# Patient Record
Sex: MALE | Race: WHITE
[De-identification: names, ages, dates, MRNs, and addresses within clinical notes are randomized per-mention and may not be internally consistent; named-entity substitution may affect disease eponyms.]

---

## 2017-02-01 ENCOUNTER — HOSPITAL ENCOUNTER (OUTPATIENT)
Dept: HOSPITAL 39 - GMAM | Age: 77
End: 2017-02-01
Attending: FAMILY MEDICINE
Payer: MEDICARE

## 2017-02-01 DIAGNOSIS — E03.9: Primary | ICD-10-CM

## 2017-02-08 ENCOUNTER — HOSPITAL ENCOUNTER (OUTPATIENT)
Dept: HOSPITAL 39 - GMAM | Age: 77
Discharge: HOME | End: 2017-02-08
Attending: FAMILY MEDICINE
Payer: MEDICARE

## 2017-02-08 DIAGNOSIS — Z12.5: ICD-10-CM

## 2017-02-08 DIAGNOSIS — D64.9: Primary | ICD-10-CM

## 2017-02-08 PROCEDURE — 83540 ASSAY OF IRON: CPT

## 2017-02-08 PROCEDURE — 83550 IRON BINDING TEST: CPT

## 2017-02-08 PROCEDURE — 82728 ASSAY OF FERRITIN: CPT

## 2017-02-08 PROCEDURE — 85045 AUTOMATED RETICULOCYTE COUNT: CPT

## 2017-03-03 ENCOUNTER — HOSPITAL ENCOUNTER (OUTPATIENT)
Dept: HOSPITAL 39 - BFHH | Age: 77
Discharge: HOME | End: 2017-03-03
Attending: FAMILY MEDICINE
Payer: MEDICARE

## 2017-03-03 DIAGNOSIS — D64.9: Primary | ICD-10-CM

## 2017-05-03 ENCOUNTER — HOSPITAL ENCOUNTER (OUTPATIENT)
Dept: HOSPITAL 39 - GMAM | Age: 77
Discharge: HOME | End: 2017-05-03
Attending: FAMILY MEDICINE
Payer: MEDICARE

## 2017-05-03 DIAGNOSIS — D64.9: Primary | ICD-10-CM

## 2017-06-20 ENCOUNTER — HOSPITAL ENCOUNTER (OUTPATIENT)
Dept: HOSPITAL 39 - GMA | Age: 77
Discharge: HOME | End: 2017-06-20
Attending: NURSE PRACTITIONER
Payer: MEDICARE

## 2017-06-20 DIAGNOSIS — R31.9: Primary | ICD-10-CM

## 2017-07-05 ENCOUNTER — HOSPITAL ENCOUNTER (OUTPATIENT)
Dept: HOSPITAL 39 - BFHH | Age: 77
Discharge: HOME | End: 2017-07-05
Attending: FAMILY MEDICINE
Payer: MEDICARE

## 2017-07-05 DIAGNOSIS — R35.0: Primary | ICD-10-CM

## 2017-07-17 ENCOUNTER — HOSPITAL ENCOUNTER (OUTPATIENT)
Dept: HOSPITAL 39 - GMA | Age: 77
End: 2017-07-17
Attending: NURSE PRACTITIONER
Payer: MEDICARE

## 2017-07-17 DIAGNOSIS — R31.9: Primary | ICD-10-CM

## 2017-08-15 ENCOUNTER — HOSPITAL ENCOUNTER (OUTPATIENT)
Dept: HOSPITAL 39 - BFHH | Age: 77
Discharge: HOME | End: 2017-08-15
Attending: FAMILY MEDICINE
Payer: MEDICARE

## 2017-08-15 DIAGNOSIS — I42.9: ICD-10-CM

## 2017-08-15 DIAGNOSIS — F32.1: ICD-10-CM

## 2017-08-15 DIAGNOSIS — I73.9: Primary | ICD-10-CM

## 2017-08-15 DIAGNOSIS — I25.10: ICD-10-CM

## 2017-09-14 ENCOUNTER — HOSPITAL ENCOUNTER (OUTPATIENT)
Dept: HOSPITAL 39 - RESP | Age: 77
End: 2017-09-14
Attending: ORTHOPAEDIC SURGERY
Payer: MEDICARE

## 2017-09-14 DIAGNOSIS — Z01.818: Primary | ICD-10-CM

## 2017-10-18 ENCOUNTER — HOSPITAL ENCOUNTER (OUTPATIENT)
Dept: HOSPITAL 39 - BFHH | Age: 77
Discharge: HOME | End: 2017-10-18
Attending: FAMILY MEDICINE
Payer: MEDICARE

## 2017-10-18 DIAGNOSIS — I73.9: Primary | ICD-10-CM

## 2017-10-18 DIAGNOSIS — I42.9: ICD-10-CM

## 2018-02-12 ENCOUNTER — HOSPITAL ENCOUNTER (OUTPATIENT)
Dept: HOSPITAL 39 - BFHH | Age: 78
End: 2018-02-12
Attending: FAMILY MEDICINE
Payer: MEDICARE

## 2018-02-12 DIAGNOSIS — I73.89: Primary | ICD-10-CM

## 2018-02-12 DIAGNOSIS — Z12.5: ICD-10-CM

## 2018-02-12 DIAGNOSIS — E03.9: ICD-10-CM

## 2018-02-12 DIAGNOSIS — L97.512: ICD-10-CM

## 2018-02-12 DIAGNOSIS — D64.9: ICD-10-CM

## 2018-02-12 DIAGNOSIS — I25.10: ICD-10-CM

## 2018-02-12 DIAGNOSIS — R97.20: ICD-10-CM

## 2018-02-12 DIAGNOSIS — Z72.0: ICD-10-CM

## 2018-04-03 ENCOUNTER — HOSPITAL ENCOUNTER (OUTPATIENT)
Dept: HOSPITAL 39 - GMAM | Age: 78
Discharge: HOME | End: 2018-04-03
Attending: FAMILY MEDICINE
Payer: MEDICARE

## 2018-04-03 DIAGNOSIS — E03.9: Primary | ICD-10-CM

## 2018-05-13 ENCOUNTER — HOSPITAL ENCOUNTER (EMERGENCY)
Dept: HOSPITAL 39 - ER | Age: 78
Discharge: HOME | End: 2018-05-13
Payer: MEDICARE

## 2018-05-13 VITALS — DIASTOLIC BLOOD PRESSURE: 61 MMHG | OXYGEN SATURATION: 95 % | TEMPERATURE: 100.9 F | SYSTOLIC BLOOD PRESSURE: 107 MMHG

## 2018-05-13 DIAGNOSIS — Z79.82: ICD-10-CM

## 2018-05-13 DIAGNOSIS — Z95.0: ICD-10-CM

## 2018-05-13 DIAGNOSIS — R50.9: ICD-10-CM

## 2018-05-13 DIAGNOSIS — R53.81: Primary | ICD-10-CM

## 2018-05-13 DIAGNOSIS — R05: ICD-10-CM

## 2018-05-13 DIAGNOSIS — E07.9: ICD-10-CM

## 2018-05-13 DIAGNOSIS — I10: ICD-10-CM

## 2018-05-13 DIAGNOSIS — I25.2: ICD-10-CM

## 2018-05-13 DIAGNOSIS — Z98.61: ICD-10-CM

## 2018-05-13 DIAGNOSIS — K21.9: ICD-10-CM

## 2018-05-13 DIAGNOSIS — Z79.02: ICD-10-CM

## 2018-05-13 PROCEDURE — 80076 HEPATIC FUNCTION PANEL: CPT

## 2018-05-13 PROCEDURE — 83605 ASSAY OF LACTIC ACID: CPT

## 2018-05-13 PROCEDURE — 82553 CREATINE MB FRACTION: CPT

## 2018-05-13 PROCEDURE — 84484 ASSAY OF TROPONIN QUANT: CPT

## 2018-05-13 PROCEDURE — 85610 PROTHROMBIN TIME: CPT

## 2018-05-13 PROCEDURE — 71045 X-RAY EXAM CHEST 1 VIEW: CPT

## 2018-05-13 PROCEDURE — 36415 COLL VENOUS BLD VENIPUNCTURE: CPT

## 2018-05-13 PROCEDURE — 82550 ASSAY OF CK (CPK): CPT

## 2018-05-13 PROCEDURE — 86788 WEST NILE VIRUS AB IGM: CPT

## 2018-05-13 PROCEDURE — 87040 BLOOD CULTURE FOR BACTERIA: CPT

## 2018-05-13 PROCEDURE — 85025 COMPLETE CBC W/AUTO DIFF WBC: CPT

## 2018-05-13 PROCEDURE — 86789 WEST NILE VIRUS ANTIBODY: CPT

## 2018-05-13 PROCEDURE — 85730 THROMBOPLASTIN TIME PARTIAL: CPT

## 2018-05-13 PROCEDURE — 80048 BASIC METABOLIC PNL TOTAL CA: CPT

## 2018-05-13 PROCEDURE — 93005 ELECTROCARDIOGRAM TRACING: CPT

## 2018-05-13 NOTE — RAD
EXAM DESCRIPTION:

Chest,1 View



CLINICAL HISTORY:

77 years Male, fever



COMPARISON:

October 2, 2016.



FINDINGS:

Heart size appears borderline. There is atherosclerotic change in

the thoracic aorta. Poststernotomy changes are again noted, along

with a left subclavian transvenous pacemaker/AICD device. The

battery pack overlies the left upper lung field. The lungs appear

otherwise essentially clear except for slightly pronounced

markings and possible mild peribronchial thickening in the right

base. The latter is not much different than on the previous

study. No major consolidation is identified.



IMPRESSION:

Possible mild chronic bronchitic change in the right lung base.

Otherwise, no radiographic evidence of acute cardiopulmonary

disease.



Electronically signed by:  Shaheen Glover MD  5/13/2018 4:21

PM CDT Workstation: [id number]

## 2018-05-13 NOTE — ED.PDOC
History of Present Illness





- General


Chief Complaint: General


Stated Complaint: Weakness, fever


Time Seen by Provider: 05/13/18 15:59


Source: patient, EMS


Exam Limitations: no limitations





- History of Present Illness


Initial Comments: 





Yovani Russo 78 y/o male brought by EMS after familly called that he had been 

feeling weak ,febrile and felt some chills since this am.No diarrhea,no N/V,no 

dysuria.Stated with some dry cough no hemoptysis no sob.


Timing/Duration: 4-6 hours


Severity: moderate


Improving Factors: nothing


Worsening Factors: nothing


Associated Symptoms: other - see hpi


Allergies/Adverse Reactions: 


Allergies





NO KNOWN ALLERGY Allergy (Verified 07/20/16 02:04)


 








Home Medications: 


Ambulatory Orders





ALPRAZolam [Xanax] 0.5 mg PO PRN 07/20/16 


Aspirin [Aspirin Adult Low Dose] 2 tablet PO QAM 07/20/16 


Carvedilol 6.25 mg PO BID 07/20/16 


Fenofibrate [Tricor] 145 mg PO QAM 07/20/16 


Hydrocodone-Acetaminophen [Hydrocodone/Acetaminophen  mg] 1 - 2 each PO 

Q6HR PRN 07/20/16 


Levothyroxine Sodium 50 mcg PO QAM 07/20/16 


Metoclopramide HCl 10 mg PO BEDTIME 07/20/16 


Clopidogrel Bisulfate [Plavix] 75 mg PO QAM 10/02/16 


DULoxetine HCL [Cymbalta] 30 mg PO BID 10/02/16 


Losartan Potassium 25 mg PO QAM 10/02/16 


Esomeprazole Magnesium [Nexium] 40 mg PO QAM 09/18/17 


Ezetimibe-Simvastatin [Vytorin 10-10 mg] 1 tab PO BEDTIME 09/18/17 


Furosemide [Lasix] 20 mg PO PRN PRN MDD DIURESIS 09/18/17 


levoFLOXacin [Levaquin] 500 mg PO DAILY 6 Days #6 tab 05/13/18 











Review of Systems





- Review of Systems


Constitutional: States: see HPI, fever


EENTM: States: no symptoms reported


Respiratory: States: cough - dry


Cardiology: States: no symptoms reported


Gastrointestinal/Abdominal: States: no symptoms reported


Musculoskeletal: States: no symptoms reported


Skin: States: no symptoms reported


Neurological: States: no symptoms reported


All other Systems: Reviewed and Negative, No Change from Baseline





Past Medical History (General)





- Patient Medical History


Hx Seizures: No


Hx Stroke: No


Hx Dementia: No


Hx Asthma: No


Hx of COPD: No


Hx Cardiac Disorders: Yes - hx MI


Hx Congestive Heart Failure: No


Hx Pacemaker: No


Hx Hypertension: Yes


Hx Thyroid Disease: Yes


Hx Diabetes: No


Hx Gastroesophageal Reflux: Yes


Hx Renal Disease: No


Hx Cancer: No


Hx of HIV: No


Hx Hepatitis C: No


Hx MRSA: No


Hx Other PMH: Yes - degenerative joint knee;PAD


Surgical History: pacemaker, other - angioplasty





- Vaccination History


Hx Tetanus, Diphtheria Vaccination: No


Hx Influenza Vaccination: Yes


Hx Pneumococcal Vaccination: Yes





- Social History


Hx Tobacco Use: No


Hx Chewing Tobacco Use: No


Hx Alcohol Use: No


Hx Substance Use: No


Hx Substance Use Treatment: No


Hx Depression: No


Hx Physical Abuse: No


Hx Emotional Abuse: No


Hx Suspected Abuse: No





- Activities of Daily Living


Patient Lives Alone: No - wife





- Female History


Patient Pregnant: No





Family Medical History





- Family History


  ** Mother


Family History: Unknown


Hx Family Cancer: Yes - mom





Physical Exam





- Physical Exam


General Appearance: Alert, Comfortable, No apparent distress


Eye Exam: bilateral normal


Ears, Nose, Throat: hearing grossly normal, normal ENT inspection, normal 

pharynx


Neck: non-tender, full range of motion, supple, normal inspection


Respiratory: chest non-tender, lungs clear, no respiratory distress


Cardiovascular/Chest: normal peripheral pulses, regular rate, rhythm, no murmur


Peripheral Pulses: radial,right: 2+, radial,left: 2+


Gastrointestinal/Abdominal: non tender, soft, no organomegaly


Back Exam: no CVA tenderness, no vertebral tenderness


Extremity: no pedal edema, no calf tenderness


Neurologic: alert, oriented x 3


Skin Exam: normal color, warm/dry


Lymphatic: no adenopathy





Progress





- Progress


Progress: 





05/13/18 16:18


 Vital Signs - 8 hr











  05/13/18





  15:57


 


Temperature 100.4 F H


 


Pulse Rate [ 94 H





Apical] 


 


Respiratory 18





Rate 


 


Blood Pressure 110/65





[Right Arm] 


 


O2 Sat by Pulse 96





Oximetry 














- Results/Orders


Results/Orders: 





 





05/13/18 16:00


BLOOD CULTURE Stat 


WEST NILE VIRUS AB PANEL Routine 


URINALYSIS Stat  (Cancelled)





05/13/18 16:02


IV Care:Saline Lock per Protoc QSHIFT 


Sodium Chloride 0.9% 500Ml [NS 500ml] 500 ml IVS .QD 








 Laboratory Results - last 24 hr











  05/13/18 05/13/18





  16:07 16:07


 


WBC  7.6 


 


RBC  3.40 L 


 


Hgb  10.2 L 


 


Hct  30.2 L 


 


MCV  88.8 


 


MCH  30.0 


 


MCHC  33.8 


 


RDW  15.0 H 


 


Plt Count  177 


 


MPV  8.7 


 


Absolute Neuts (auto)  5.90 


 


Absolute Lymphs (auto)  0.80 L 


 


Absolute Monos (auto)  0.50 


 


Absolute Eos (auto)  0.30 


 


Absolute Basos (auto)  0.00 


 


Neutrophils %  78.0 


 


Lymphocytes %  10.8 L 


 


Monocytes %  6.9 


 


Eosinophils %  3.8 


 


Basophils %  0.5 


 


PT  13.5 H 


 


INR  1.170 


 


PTT (SP)  36.9 H 


 


Sodium  135 


 


Potassium  4.0 


 


Chloride  100 L 


 


Carbon Dioxide  25 


 


Anion Gap  14.0 


 


BUN  29 H 


 


Creatinine  1.34 H 


 


BUN/Creatinine Ratio  21.6 H 


 


Random Glucose  101 


 


Serum Osmolality  276.1 


 


Lactic Acid   1.2


 


Calcium  9.3 


 


Magnesium  1.7 L 


 


Total Bilirubin  0.6 


 


Direct Bilirubin  0.2 


 


Indirect Bilirubin  0.4 


 


AST  20 


 


ALT  11 


 


Alkaline Phosphatase  59 


 


Creatine Kinase  49 


 


CK-MB (CK-2)  1.0 


 


CK-MB (CK-2) %  Not Reportable 


 


Troponin I  0.03 


 


Serum Total Protein  7.3 


 


Albumin  3.4 














- EKG/XRAY/CT


Comments: HR-86 pacemaker ,rhythm


XRAY: chest - mild chronic bronchitic changes lung base /radiologist





Departure





- Departure


Clinical Impression: 


 Malaise and fatigue





Fever


Qualifiers:


 Fever type: unspecified Qualified Code(s): R50.9 - Fever, unspecified





Time of Disposition: 19:28


Disposition: Discharge to Home or Self Care


Condition: Fair


Departure Forms:  ED Discharge - Pt. Copy, Patient Portal Self Enrollment


Instructions:  DI for Fever (Symptom) -- Adult


Referrals: 


Vasquez Flores MD [Primary Care Provider] - 1-2 Weeks


Prescriptions: 


levoFLOXacin [Levaquin] 500 mg PO DAILY 6 Days #6 tab


Home Medications: 


Ambulatory Orders





ALPRAZolam [Xanax] 0.5 mg PO PRN 07/20/16 


Aspirin [Aspirin Adult Low Dose] 2 tablet PO QAM 07/20/16 


Carvedilol 6.25 mg PO BID 07/20/16 


Fenofibrate [Tricor] 145 mg PO QAM 07/20/16 


Hydrocodone-Acetaminophen [Hydrocodone/Acetaminophen  mg] 1 - 2 each PO 

Q6HR PRN 07/20/16 


Levothyroxine Sodium 50 mcg PO QAM 07/20/16 


Metoclopramide HCl 10 mg PO BEDTIME 07/20/16 


Clopidogrel Bisulfate [Plavix] 75 mg PO QAM 10/02/16 


DULoxetine HCL [Cymbalta] 30 mg PO BID 10/02/16 


Losartan Potassium 25 mg PO QAM 10/02/16 


Esomeprazole Magnesium [Nexium] 40 mg PO QAM 09/18/17 


Ezetimibe-Simvastatin [Vytorin 10-10 mg] 1 tab PO BEDTIME 09/18/17 


Furosemide [Lasix] 20 mg PO PRN PRN MDD DIURESIS 09/18/17 


levoFLOXacin [Levaquin] 500 mg PO DAILY 6 Days #6 tab 05/13/18 








Additional Instructions: 


Call up primary MD office in am 14/May 2018 for followup;Return to ER as needed;

Continue with all home medications;Tylenol 500mg one tablet every 6 hours as 

needed for fever

## 2018-10-25 ENCOUNTER — HOSPITAL ENCOUNTER (OUTPATIENT)
Dept: HOSPITAL 39 - RAD | Age: 78
End: 2018-10-25
Attending: ORTHOPAEDIC SURGERY
Payer: MEDICARE

## 2018-10-25 DIAGNOSIS — M25.561: ICD-10-CM

## 2018-10-25 DIAGNOSIS — M25.552: ICD-10-CM

## 2018-10-25 DIAGNOSIS — M25.562: ICD-10-CM

## 2018-10-25 DIAGNOSIS — M17.12: ICD-10-CM

## 2018-10-25 DIAGNOSIS — M17.11: Primary | ICD-10-CM

## 2018-10-25 DIAGNOSIS — M25.551: ICD-10-CM

## 2018-10-25 NOTE — RAD
EXAM DESCRIPTION: Pelvis



CLINICAL HISTORY: 78 years Male, HIP PN



COMPARISON: None.



FINDINGS: AP pelvis shows diffuse decreased bone density

consistent with osteopenia versus osteoporosis.

No fracture.

No bone lesion.

Severe diffuse calcific atherosclerosis.

Scoliosis and degenerative and postop changes of the lower lumbar

spine.



IMPRESSION:

Advanced chronic changes as noted-no acute finding



Electronically signed by:  Arnel Kaufman MD  10/25/2018 1:48 PM

CDT Workstation: [id number]

## 2018-10-25 NOTE — RAD
EXAM DESCRIPTION: Knee,Right Complete



CLINICAL HISTORY: 78 years, Male, KNEE PN



COMPARISON: None



TECHNIQUE: 4 views of the right knee



FINDINGS: Severe osteoarthritis of the right knee all 3

compartments but especially lateral compartment which is

completely collapsed. There is resulting valgus deformity.

Chondrocalcinosis medial meniscus. Moderate joint effusion.

No fracture or bone lesion.

Severe diffuse calcific atherosclerosis.



IMPRESSION: 

1.  Severe tricompartmental osteoarthritis with collapse of the

lateral compartment



Electronically signed by:  Arnel Kaufman MD  10/25/2018 1:47 PM

CDT Workstation: [id number]

## 2018-10-25 NOTE — RAD
EXAM DESCRIPTION: Knee,Left Complete



CLINICAL HISTORY: 78 years, Male, KNEE PN



COMPARISON: None



TECHNIQUE: 4 views of the left knee



FINDINGS: No acute fracture or dislocation or bone lesion. Minor

medial and patellofemoral joint space narrowing/osteoarthritis.

Tiny joint effusion.

Diffuse atrophy chronic vascular calcification of the SFA,

popliteal, and trifurcation vessels.

Slight deformity proximal fibula from remote prior fracture which

has healed.



IMPRESSION: 

1.  Osteoarthritis-mild

2.  Severe calcific atherosclerosis



Electronically signed by:  Arnel Kaufman MD  10/25/2018 1:34 PM

CDT Workstation: [id number]

## 2018-11-05 ENCOUNTER — HOSPITAL ENCOUNTER (OUTPATIENT)
Dept: HOSPITAL 39 - GMAM | Age: 78
End: 2018-11-05
Attending: FAMILY MEDICINE
Payer: MEDICARE

## 2018-11-05 DIAGNOSIS — E03.9: Primary | ICD-10-CM

## 2018-11-05 DIAGNOSIS — Z12.5: ICD-10-CM

## 2018-11-05 PROCEDURE — 84443 ASSAY THYROID STIM HORMONE: CPT

## 2018-11-05 PROCEDURE — 84439 ASSAY OF FREE THYROXINE: CPT

## 2018-12-04 ENCOUNTER — HOSPITAL ENCOUNTER (OUTPATIENT)
Dept: HOSPITAL 39 - RESP | Age: 78
End: 2018-12-04
Attending: FAMILY MEDICINE
Payer: MEDICARE

## 2018-12-04 DIAGNOSIS — Z01.818: Primary | ICD-10-CM

## 2019-01-24 ENCOUNTER — HOSPITAL ENCOUNTER (OUTPATIENT)
Dept: HOSPITAL 39 - US | Age: 79
End: 2019-01-24
Attending: PHYSICIAN ASSISTANT
Payer: MEDICARE

## 2019-01-24 DIAGNOSIS — M79.609: ICD-10-CM

## 2019-01-24 DIAGNOSIS — I70.8: ICD-10-CM

## 2019-01-24 DIAGNOSIS — I70.202: Primary | ICD-10-CM

## 2019-01-24 DIAGNOSIS — I73.9: ICD-10-CM

## 2019-01-24 NOTE — US
EXAM DESCRIPTION:  Extremity,Lower LT Arteries



CLINICAL HISTORY:  78 years  Male  M79.609



COMPARISON:  None.



TECHNIQUE:  Duplex imaging performed to evaluate the left lower

extremity venous structures. Compression imaging and augmentation

imaging performed. The common femoral, superficial femoral,

popliteal, greater saphenous and posterior tibial veins were

examined.



FINDINGS:



The velocity in the common femoral artery is 91 cm/s.

 There is a small amount of calcified plaquing. Triphasic

waveforms are noted in the common femoral.

There are triphasic waveforms in the proximal aspect of the

superficial femoral with a normal velocity of 67 cm/s.

Scattered areas of calcified and noncalcified plaque are present.

There is monophasic flow in the mid and distal superficial

femoral. Elevated velocity in the distal SFA consistent with a

focal area of stenosis.

Very minimal flow in the popliteal with a velocity 10 cm/s.

Monophasic tardus parvus flow suggesting severe stenosis.

Minimal monophasic flow in the region of the left peroneal. No

significant flow in the posterior tibial and dorsalis pedis.



IMPRESSION:



Diffuse atherosclerotic plaquing throughout the left lower

extremity



Findings suggest stenosis in the proximal to mid superficial

femoral artery with diffusely diseased superficial femoral and

popliteal



High-grade stenosis in the distal left SFA/proximal popliteal

resulting in diminished flow in the popliteal and calf



Monophasic flow with diminished velocities peroneal. No

significant flow is noted in the posterior tibial or dorsalis

pedis.



Electronically signed by:  Ingris Rubio MD  1/24/2019 5:26 PM

CST Workstation: [id number]

## 2019-01-24 NOTE — US
EXAM DESCRIPTION:  Venous,Lower Extremity LT



CLINICAL HISTORY:  78 years  Male  M79.609, left foot and toe

pain



COMPARISON:  None.



TECHNIQUE:  Duplex imaging performed to evaluate the left lower

extremity venous structures. Compression imaging and augmentation

imaging performed. The common femoral, superficial femoral,

popliteal, greater saphenous and posterior tibial veins were

examined.



FINDINGS:



No thrombus is identified in the left lower extremity venous

structures. 



IMPRESSION:



No DVT is identified in the left lower extremity. 



Electronically signed by:  Ingris Rubio MD  1/24/2019 5:26 PM

CST Workstation: [id number]

## 2019-01-25 ENCOUNTER — HOSPITAL ENCOUNTER (EMERGENCY)
Dept: HOSPITAL 39 - ER | Age: 79
Discharge: HOME | End: 2019-01-25
Payer: MEDICARE

## 2019-01-25 VITALS — SYSTOLIC BLOOD PRESSURE: 125 MMHG | OXYGEN SATURATION: 95 % | DIASTOLIC BLOOD PRESSURE: 59 MMHG

## 2019-01-25 VITALS — TEMPERATURE: 97.1 F

## 2019-01-25 DIAGNOSIS — I25.2: ICD-10-CM

## 2019-01-25 DIAGNOSIS — K21.9: ICD-10-CM

## 2019-01-25 DIAGNOSIS — I25.10: ICD-10-CM

## 2019-01-25 DIAGNOSIS — I70.202: Primary | ICD-10-CM

## 2019-01-25 DIAGNOSIS — Z79.82: ICD-10-CM

## 2019-01-25 DIAGNOSIS — Z95.1: ICD-10-CM

## 2019-01-25 DIAGNOSIS — Z79.899: ICD-10-CM

## 2019-01-25 DIAGNOSIS — Z59.0: ICD-10-CM

## 2019-01-25 DIAGNOSIS — I10: ICD-10-CM

## 2019-01-25 DIAGNOSIS — E07.9: ICD-10-CM

## 2019-01-25 PROCEDURE — 85610 PROTHROMBIN TIME: CPT

## 2019-01-25 PROCEDURE — 85025 COMPLETE CBC W/AUTO DIFF WBC: CPT

## 2019-01-25 PROCEDURE — 80053 COMPREHEN METABOLIC PANEL: CPT

## 2019-01-25 PROCEDURE — 85730 THROMBOPLASTIN TIME PARTIAL: CPT

## 2019-01-25 PROCEDURE — 36415 COLL VENOUS BLD VENIPUNCTURE: CPT

## 2019-01-25 SDOH — ECONOMIC STABILITY - HOUSING INSECURITY: HOMELESSNESS: Z59.0

## 2019-01-25 NOTE — ED.PDOC
History of Present Illness





- General


Time Seen by Provider: 01/25/19 10:23


Source: patient, family


Exam Limitations: no limitations





- History of Present Illness


Initial Comments: 


patient comes in today for severe high-grade stenosis of the left SS8.  Patient 

states the week ago he started noticing some pain, cramping, and swelling of his

left lower extremity.  Yesterday he was evaluated as an outpatient with duplex 

Doppler that showed high-grade stenosiswith diminished flow in the popliteal and

calf.  The patient was told to report to the emergency room for transfer but he 

did not come until this morning.  Patient states the pain is becoming more and 

more severe despite hydrocodone that he has a home.  Patient states he had no 

injury prior to this note any symptoms.  He does have a history of coronary 

artery disease status post CABG.  Patient does have a defibrillator and 

pacemaker in place.  Patient denies any fever, chills, nausea or vomiting.





Occurred: other - worsening over a week


Pain - Lower Extremity: severe: Left Calf


Method of Injury: unknown


Improving Factors: nothing


Worsening Factors: movement


Allergies/Adverse Reactions: 


Allergies





NO KNOWN ALLERGY Allergy (Verified 07/20/16 02:04)


   








Home Medications: 


Ambulatory Orders





ALPRAZolam [Xanax] 0.5 mg PO PRN 07/20/16 


Aspirin [Aspirin Adult Low Dose] 2 tablet PO QAM 07/20/16 


Carvedilol 6.25 mg PO BID 07/20/16 


Fenofibrate [Tricor] 145 mg PO QAM 07/20/16 


Hydrocodone-Acetaminophen [Hydrocodone/Acetaminophen  mg] 1 - 2 each PO 

Q6HR PRN 07/20/16 


Levothyroxine Sodium 50 mcg PO QAM 07/20/16 


Metoclopramide HCl 10 mg PO BEDTIME 07/20/16 


Clopidogrel Bisulfate [Plavix] 75 mg PO QAM 10/02/16 


DULoxetine HCL [Cymbalta] 30 mg PO BID 10/02/16 


Losartan Potassium 25 mg PO QAM 10/02/16 


Esomeprazole Magnesium [Nexium] 40 mg PO QAM 09/18/17 


Ezetimibe-Simvastatin [Vytorin 10-10 mg] 1 tab PO BEDTIME 09/18/17 


Furosemide [Lasix] 20 mg PO PRN PRN MDD DIURESIS 09/18/17 


levoFLOXacin [Levaquin] 500 mg PO DAILY 6 Days #6 tab 05/13/18 











Review of Systems





- Review of Systems


Constitutional: States: no symptoms reported.  Denies: chills, fever


EENTM: States: no symptoms reported


Respiratory: States: no symptoms reported.  Denies: cough, short of breath, 

wheezing


Cardiology: States: no symptoms reported.  Denies: chest pain, palpitations


Gastrointestinal/Abdominal: States: no symptoms reported.  Denies: nausea, 

vomiting


Musculoskeletal: States: see HPI





Past Medical History (General)





- Patient Medical History


Hx Seizures: No


Hx Stroke: No


Hx Dementia: No


Hx Asthma: No


Hx of COPD: No


Hx Cardiac Disorders: Yes - hx MI


Hx Congestive Heart Failure: No


Hx Pacemaker: No


Hx Hypertension: Yes


Hx Thyroid Disease: Yes


Hx Diabetes: No


Hx Gastroesophageal Reflux: Yes


Hx Renal Disease: No


Hx Cancer: No


Hx of HIV: No


Hx Hepatitis C: No


Hx MRSA: No





- Vaccination History


Hx Tetanus, Diphtheria Vaccination: No


Hx Influenza Vaccination: Yes


Hx Pneumococcal Vaccination: Yes





- Social History


Hx Tobacco Use: No


Hx Chewing Tobacco Use: No


Hx Alcohol Use: No


Hx Substance Use: No


Hx Substance Use Treatment: No


Hx Depression: No


Hx Physical Abuse: No


Hx Emotional Abuse: No


Hx Suspected Abuse: No





- Female History


Patient Pregnant: No





Family Medical History





- Family History


  ** Mother


Family History: Unknown


Hx Family Cancer: Yes - mom





Physical Exam





- Physical Exam


General Appearance: Alert, Anxious, Obvious distress


Eyes, Ears, Nose, Throat: PERRL/EOMI, normal ENT inspection, TMs normal, pharynx

 normal


Neck: non-tender, full range of motion, supple, normal inspection


Cardiovascular/Respiratory: regular rate, rhythm, no M/R/G, normal peripheral 

pulses, no JVD, normal breath sounds


Gastrointestinal/Abdominal: non-tender


Leg: other - L LE with swelling to the knee with erythema from the knee to toes 

with not palpable pulse and severe pain with minimal palpation no ulcerations 





Progress





- Progress


Progress: 


will consult vascular for evaluation


01/25/19 10:29








- Results/Orders


Results/Orders: 





Doppler shows high grade stenosis in the distal left SFA/proximal popliteal 

resulting in diminished flow in the popliteal and calf with velocity of 10 cm/s





Departure





- Departure


Clinical Impression: 


 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





Disposition: Discharge to Home or Self Care


Condition: Fair


Referrals: 


Vasquez Flores MD [Primary Care Provider] - 1-2 Weeks


Home Medications: 


Ambulatory Orders





ALPRAZolam [Xanax] 0.5 mg PO PRN 07/20/16 


Aspirin [Aspirin Adult Low Dose] 2 tablet PO QAM 07/20/16 


Carvedilol 6.25 mg PO BID 07/20/16 


Fenofibrate [Tricor] 145 mg PO QAM 07/20/16 


Hydrocodone-Acetaminophen [Hydrocodone/Acetaminophen  mg] 1 - 2 each PO 

Q6HR PRN 07/20/16 


Levothyroxine Sodium 50 mcg PO QAM 07/20/16 


Metoclopramide HCl 10 mg PO BEDTIME 07/20/16 


Clopidogrel Bisulfate [Plavix] 75 mg PO QAM 10/02/16 


DULoxetine HCL [Cymbalta] 30 mg PO BID 10/02/16 


Losartan Potassium 25 mg PO QAM 10/02/16 


Esomeprazole Magnesium [Nexium] 40 mg PO QAM 09/18/17 


Ezetimibe-Simvastatin [Vytorin 10-10 mg] 1 tab PO BEDTIME 09/18/17 


Furosemide [Lasix] 20 mg PO PRN PRN MDD DIURESIS 09/18/17 


levoFLOXacin [Levaquin] 500 mg PO DAILY 6 Days #6 tab 05/13/18 











Transfer to Outside Facility





- Transfer Information


Accepting Provider:: Dr Mcnair


Accepting Facility: [facility]


Reason for Transfer: specialized care not available

## 2019-01-31 ENCOUNTER — HOSPITAL ENCOUNTER (EMERGENCY)
Dept: HOSPITAL 39 - ER | Age: 79
Discharge: ANOTHER HOSPITAL | End: 2019-01-31
Payer: MEDICARE

## 2019-01-31 VITALS — TEMPERATURE: 99.4 F

## 2019-01-31 VITALS — SYSTOLIC BLOOD PRESSURE: 123 MMHG | OXYGEN SATURATION: 97 % | DIASTOLIC BLOOD PRESSURE: 63 MMHG

## 2019-01-31 DIAGNOSIS — D64.9: ICD-10-CM
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
## 2019-01-31 DIAGNOSIS — S72.001A: Primary | ICD-10-CM

## 2019-01-31 DIAGNOSIS — K21.9: ICD-10-CM

## 2019-01-31 DIAGNOSIS — Z95.0: ICD-10-CM

## 2019-01-31 DIAGNOSIS — Z79.02: ICD-10-CM

## 2019-01-31 DIAGNOSIS — I73.9: ICD-10-CM

## 2019-01-31 DIAGNOSIS — E07.9: ICD-10-CM

## 2019-01-31 DIAGNOSIS — I25.10: ICD-10-CM

## 2019-01-31 DIAGNOSIS — I25.2: ICD-10-CM

## 2019-01-31 DIAGNOSIS — I10: ICD-10-CM

## 2019-01-31 DIAGNOSIS — Z79.82: ICD-10-CM
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
## 2019-01-31 PROCEDURE — 85025 COMPLETE CBC W/AUTO DIFF WBC: CPT

## 2019-01-31 PROCEDURE — 36415 COLL VENOUS BLD VENIPUNCTURE: CPT

## 2019-01-31 PROCEDURE — 85730 THROMBOPLASTIN TIME PARTIAL: CPT

## 2019-01-31 PROCEDURE — 73551 X-RAY EXAM OF FEMUR 1: CPT

## 2019-01-31 PROCEDURE — 85610 PROTHROMBIN TIME: CPT

## 2019-01-31 PROCEDURE — 80053 COMPREHEN METABOLIC PANEL: CPT

## 2019-01-31 PROCEDURE — 72170 X-RAY EXAM OF PELVIS: CPT

## 2019-01-31 PROCEDURE — 73502 X-RAY EXAM HIP UNI 2-3 VIEWS: CPT

## 2019-01-31 NOTE — RAD
Single frontal view pelvis. Two-view right hip. Two-view right

femur.



Indication: fall wiht pain to right hip



Comparison: None.



Impression:



Comminuted intertrochanteric right femoral neck fracture with

proximal migration of the distal fracture fragment by

approximately 2 cm. Mild to moderate bilateral hip

osteoarthritis.



No additional pelvic fractures identified. Lumbosacral fusion

construct partially visualized.



Extensive scattered vascular calcifications throughout the pelvis

and right upper leg.



No definite fracture of the distal right femur. Severe right

medial knee compartment osteoarthritis.



Osteopenia. If this is a new finding, DEXA scan recommended as

well as evaluation for possible osteoporosis treatment. 



Electronically signed by:  Romulo Johnson MD  1/31/2019 1:11 PM [facility]

Workstation: [id number]

## 2019-01-31 NOTE — RAD
Single frontal view pelvis. Two-view right hip. Two-view right

femur.



Indication: fall wiht pain to right hip



Comparison: None.



Impression:



Comminuted intertrochanteric right femoral neck fracture with

proximal migration of the distal fracture fragment by

approximately 2 cm. Mild to moderate bilateral hip

osteoarthritis.



No additional pelvic fractures identified. Lumbosacral fusion

construct partially visualized.



Extensive scattered vascular calcifications throughout the pelvis

and right upper leg.



No definite fracture of the distal right femur. Severe right

medial knee compartment osteoarthritis.



Osteopenia. If this is a new finding, DEXA scan recommended as

well as evaluation for possible osteoporosis treatment. 



Electronically signed by:  Romulo Johnson MD  1/31/2019 1:11 PM [facility]

Workstation: [id number]

## 2019-01-31 NOTE — ED.PDOC
History of Present Illness





- General


Chief Complaint: Trauma


Stated Complaint: right hip, left leg pain


Time Seen by Provider: 01/31/19 12:37


Source: patient, EMS notes reviewed


Exam Limitations: clinical condition





- History of Present Illness


Initial Comments: 





The patient is a 78-year-old  male presenting to the emergency room 

after having fallen at home.  He does have a history of frequent falls.  This 

time he has severe hip pain after the fall on the right with some mild 

shortening of the leg and external rotation.  He does have a history of severe 

peripheral vascular disease with apparently a failed revascularization by 

catheterization last week.  That was done on the left.  He does have some mild 

cyanosis to that foot which is apparently chronic.  On the right side he does 

have a thready palpable dorsalis pedis pulse.  Foot is mildly pale but not 

cyanotic.  He is able to move the foot well he is able to move the knee well 

most of his pain is in the right hip.  He also does have some pain in the left 

foot which is again chronic due to ischemia.  Apparently there are plans to have

the patient revascularized in the [facility] in the coming weeks on the left 

lower extremity.  Initially the patient was only able to give a little bit of 

information as he had had a fairly large amount of fentanyl and transit with EMS

mental status has cleared up and he is able to give some more information.  

Several weeks ago orthopedics here had planned on doing a knee replacement 

however they were unable to obtain cardiac clearance and therefore it has not 

been done either.  The patient does have a history of coronary artery disease 

and pacemaker placement.  Again he has severe peripheral vascular disease.  He 

does take aspirin and Plavix.  He reports that he took them this morning.


Timing/Duration: momentarily


Severity: severe


Improving Factors: nothing


Worsening Factors: movement


Associated Symptoms: denies symptoms


Allergies/Adverse Reactions: 


Allergies





NO KNOWN ALLERGY Allergy (Verified 07/20/16 02:04)


   








Home Medications: 


Ambulatory Orders





ALPRAZolam [Xanax] 0.5 mg PO PRN 07/20/16 


Aspirin [Aspirin Adult Low Dose] 2 tablet PO QAM 07/20/16 


Carvedilol 6.25 mg PO BID 07/20/16 


Fenofibrate [Tricor] 145 mg PO QAM 07/20/16 


Hydrocodone-Acetaminophen [Hydrocodone/Acetaminophen  mg] 1 - 2 each PO 

Q6HR PRN 07/20/16 


Levothyroxine Sodium 50 mcg PO QAM 07/20/16 


Metoclopramide HCl 10 mg PO BEDTIME 07/20/16 


Clopidogrel Bisulfate [Plavix] 75 mg PO QAM 10/02/16 


DULoxetine HCL [Cymbalta] 30 mg PO BID 10/02/16 


Losartan Potassium 25 mg PO QAM 10/02/16 


Esomeprazole Magnesium [Nexium] 40 mg PO QAM 09/18/17 


Ezetimibe-Simvastatin [Vytorin 10-10 mg] 1 tab PO BEDTIME 09/18/17 


Furosemide [Lasix] 20 mg PO PRN PRN MDD DIURESIS 09/18/17 


levoFLOXacin [Levaquin] 500 mg PO DAILY 6 Days #6 tab 05/13/18 











Review of Systems





- Review of Systems


Constitutional: States: no symptoms reported


EENTM: States: no symptoms reported


Respiratory: States: no symptoms reported


Cardiology: States: no symptoms reported


Gastrointestinal/Abdominal: States: no symptoms reported


Genitourinary: States: no symptoms reported


Musculoskeletal: States: see HPI


Skin: States: see HPI


Neurological: States: other - chronic pain due to ischemia in his left  foot


Endocrine: States: no symptoms reported


All other Systems: No Change from Baseline





Past Medical History (General)





- Patient Medical History


Hx Seizures: No


Hx Stroke: No


Hx Dementia: No


Hx Asthma: No


Hx of COPD: No


Hx Cardiac Disorders: Yes - hx MI


Hx Congestive Heart Failure: No


Hx Pacemaker: Yes


Hx Hypertension: Yes


Hx Thyroid Disease: Yes


Hx Diabetes: No


Hx Gastroesophageal Reflux: Yes


Hx Renal Disease: No


Hx Cancer: No


Hx of HIV: No


Hx Hepatitis C: No


Hx MRSA: No





- Vaccination History


Hx Tetanus, Diphtheria Vaccination: No


Hx Influenza Vaccination: Yes


Hx Pneumococcal Vaccination: Yes





- Social History


Hx Tobacco Use: No


Hx Chewing Tobacco Use: No


Hx Alcohol Use: No


Hx Substance Use: No


Hx Substance Use Treatment: No


Hx Depression: No


Hx Physical Abuse: No


Hx Emotional Abuse: No


Hx Suspected Abuse: No





- Female History


Patient Pregnant: No





Family Medical History





- Family History


  ** Mother


Family History: Unknown


Hx Family Cancer: Yes - mom





Physical Exam





- Physical Exam


General Appearance: Alert, Other - the patient was initially confused secondary 

to pain medications but mental status does clear up once they wear off.  He is 

obviously in pain.


Eye Exam: bilateral normal


Ears, Nose, Throat: normal ENT inspection, normal pharynx


Neck: full range of motion, supple


Respiratory: lungs clear, normal breath sounds, no respiratory distress, no 

accessory muscle use


Cardiovascular/Chest: normal peripheral pulses, no edema, other - regular rate. 

 Pacemaker in place.


Peripheral Pulses: radial,right: 2+, radial,left: 2+, dorsalis pedis,right: 1+, 

dorsalis pedis,left: 0, posterior tibialis,right: 0, posterior tibialis,left: 0


Gastrointestinal/Abdominal: non tender, soft


Rectal Exam: deferred, other - pelvis appears to be stable


Back Exam: no CVA tenderness, no vertebral tenderness


Extremity: no pedal edema, no calf tenderness, other - chronic cyanosis to the 

left lower extremity.


Neurologic: CNs II-XII nml as tested, oriented x 3 - once the pain medications 

wear off


Skin Exam: other - see above


Comments: 





                               Vital Signs - 24 hr











  01/31/19





  12:30


 


Temperature 99.5 F


 


Pulse Rate [ 68





LEFT HAND] 


 


Respiratory 16





Rate 


 


Blood Pressure 127/63





[Left Arm] 


 


O2 Sat by Pulse 100





Oximetry 














Progress





- Progress


Progress: 





01/31/19 13:44


the patient is a 78-year-old  male presenting to emergency room after a

fall at home with what appears to be a closed comminuted intertrochanteric right

femoral neck fracture with mild proximal migration of the distal fracture 

fragment by approximately 2 cm.  the patient is a high-risk patient and does 

need to be seen by cardiology prior to procedure.  He did apparently take his 

Plavix this morning.  Transferring for higher level of care.  CBC, CMP and coags

are being drawn for preoperative baseline and will be sent along as they return.

transferred for higher level of care.





Departure





- Departure


Clinical Impression: 


 Fall at home





Fracture, femur neck


Qualifiers:


 Encounter type: initial encounter Fracture type: closed Laterality: right 

Qualified Code(s): S72.001A - Fracture of unspecified part of neck of right 

femur, initial encounter for closed fracture





Disposition: Transfer to Hospital


Condition: Poor


Departure Forms:  ED Discharge - Pt. Copy, Patient Portal Self Enrollment


Referrals: 


Vasquez Flores MD [Primary Care Provider] - 1-2 Weeks


Home Medications: 


Ambulatory Orders





ALPRAZolam [Xanax] 0.5 mg PO PRN 07/20/16 


Aspirin [Aspirin Adult Low Dose] 2 tablet PO QAM 07/20/16 


Carvedilol 6.25 mg PO BID 07/20/16 


Fenofibrate [Tricor] 145 mg PO QAM 07/20/16 


Hydrocodone-Acetaminophen [Hydrocodone/Acetaminophen  mg] 1 - 2 each PO 

Q6HR PRN 07/20/16 


Levothyroxine Sodium 50 mcg PO QAM 07/20/16 


Metoclopramide HCl 10 mg PO BEDTIME 07/20/16 


Clopidogrel Bisulfate [Plavix] 75 mg PO QAM 10/02/16 


DULoxetine HCL [Cymbalta] 30 mg PO BID 10/02/16 


Losartan Potassium 25 mg PO QAM 10/02/16 


Esomeprazole Magnesium [Nexium] 40 mg PO QAM 09/18/17 


Ezetimibe-Simvastatin [Vytorin 10-10 mg] 1 tab PO BEDTIME 09/18/17 


Furosemide [Lasix] 20 mg PO PRN PRN MDD DIURESIS 09/18/17 


levoFLOXacin [Levaquin] 500 mg PO DAILY 6 Days #6 tab 05/13/18 











Transfer to Outside Facility





- Transfer Information


Accepting Provider:: dr manjarrez


Accepting Facility: [facility]


Reason for Transfer: required specialist not available

## 2019-01-31 NOTE — RAD
Single frontal view pelvis. Two-view right hip. Two-view right

femur.



Indication: fall wiht pain to right hip



Comparison: None.



Impression:



Comminuted intertrochanteric right femoral neck fracture with

proximal migration of the distal fracture fragment by

approximately 2 cm. Mild to moderate bilateral hip

osteoarthritis.



No additional pelvic fractures identified. Lumbosacral fusion

construct partially visualized.



Extensive scattered vascular calcifications throughout the pelvis

and right upper leg.



No definite fracture of the distal right femur. Severe right

medial knee compartment osteoarthritis.



Osteopenia. If this is a new finding, DEXA scan recommended as

well as evaluation for possible osteoporosis treatment. 



Electronically signed by:  Romulo Johnson MD  1/31/2019 1:11 PM [facility]

Workstation: [id number]

## 2019-04-08 ENCOUNTER — HOSPITAL ENCOUNTER (EMERGENCY)
Dept: HOSPITAL 39 - ER | Age: 79
Discharge: ANOTHER HOSPITAL | End: 2019-04-08
Payer: MEDICARE

## 2019-04-08 VITALS — TEMPERATURE: 96.8 F | SYSTOLIC BLOOD PRESSURE: 112 MMHG | DIASTOLIC BLOOD PRESSURE: 93 MMHG

## 2019-04-08 VITALS — OXYGEN SATURATION: 100 %

## 2019-04-08 DIAGNOSIS — M79.605: ICD-10-CM

## 2019-04-08 DIAGNOSIS — Z87.891: ICD-10-CM

## 2019-04-08 DIAGNOSIS — I73.9: Primary | ICD-10-CM

## 2019-04-08 DIAGNOSIS — Z79.899: ICD-10-CM

## 2019-04-08 DIAGNOSIS — K21.9: ICD-10-CM

## 2019-04-08 DIAGNOSIS — Z95.0: ICD-10-CM

## 2019-04-08 DIAGNOSIS — Z79.82: ICD-10-CM

## 2019-04-08 DIAGNOSIS — I25.2: ICD-10-CM

## 2019-04-08 DIAGNOSIS — E07.9: ICD-10-CM

## 2019-04-08 DIAGNOSIS — I10: ICD-10-CM

## 2019-04-08 NOTE — ED.PDOC
History of Present Illness





- General


Chief Complaint: General


Stated Complaint: pain to LLE


Time Seen by Provider: 04/08/19 14:58


Source: patient, EMS


Exam Limitations: no limitations





- History of Present Illness


Initial Comments: 





Pt has been transported from a rehab in [place] to here for Possible pain 

control.  Pt was advised by Dr Alba's office that he should be transported to 

[place] for admission to medical service and Ortho consult for possible 

amputation of L leg .  Pt has peripheral vascular disease and severe pain in the

left leg for which oral meds have been inadequate.


Timing/Duration: unsure


Severity: moderate


Improving Factors: nothing


Worsening Factors: nothing


Associated Symptoms: denies symptoms


Allergies/Adverse Reactions: 


Allergies





NO KNOWN ALLERGY Allergy (Verified 07/20/16 02:04)


   





Home Medications: 


Ambulatory Orders





ALPRAZolam [Xanax] 0.5 mg PO PRN 07/20/16 


Aspirin [Aspirin Adult Low Dose] 2 tablet PO QAM 07/20/16 


Carvedilol 6.25 mg PO BID 07/20/16 


Fenofibrate [Tricor] 145 mg PO QAM 07/20/16 


Hydrocodone-Acetaminophen [Hydrocodone/Acetaminophen  mg] 1 - 2 each PO 

Q6HR PRN 07/20/16 


Levothyroxine Sodium 50 mcg PO QAM 07/20/16 


Metoclopramide HCl [Metoclopramide Hydrochlor] 10 mg PO BEDTIME 07/20/16 


Clopidogrel Bisulfate [Plavix] 75 mg PO QAM 10/02/16 


DULoxetine HCL [Cymbalta] 30 mg PO BID 10/02/16 


Losartan Potassium 25 mg PO QAM 10/02/16 


Esomeprazole Magnesium [Nexium] 40 mg PO QAM 09/18/17 


Ezetimibe-Simvastatin [Vytorin 10-10 mg] 1 tab PO BEDTIME 09/18/17 


Furosemide [Lasix] 20 mg PO PRN PRN MDD DIURESIS 09/18/17 











Review of Systems





- Review of Systems


Constitutional: States: no symptoms reported


EENTM: States: no symptoms reported


Respiratory: States: no symptoms reported


Cardiology: States: no symptoms reported


Gastrointestinal/Abdominal: States: no symptoms reported


Genitourinary: States: no symptoms reported


Musculoskeletal: States: joint pain, muscle pain - L leg


Skin: States: no symptoms reported


Neurological: States: no symptoms reported





Past Medical History (General)





- Patient Medical History


Hx Seizures: No


Hx Stroke: No


Hx Dementia: No


Hx Asthma: No


Hx of COPD: No


Hx Cardiac Disorders: Yes - hx MI


Hx Congestive Heart Failure: No


Hx Pacemaker: Yes


Hx Hypertension: Yes


Hx Thyroid Disease: Yes


Hx Diabetes: No


Hx Gastroesophageal Reflux: Yes


Hx Renal Disease: No


Hx Cancer: No


Hx of HIV: No


Hx Hepatitis C: No


Hx MRSA: No





- Vaccination History


Hx Tetanus, Diphtheria Vaccination: No


Hx Influenza Vaccination: Yes


Hx Pneumococcal Vaccination: Yes





- Social History


Hx Tobacco Use: Yes


Hx Chewing Tobacco Use: No


Hx Alcohol Use: No


Hx Substance Use: No


Hx Substance Use Treatment: No


Hx Depression: No


Hx Physical Abuse: No


Hx Emotional Abuse: No


Hx Suspected Abuse: No





- Activities of Daily Living


Nursing Home/Assisted Living (if applicable):: [facility]





- Female History


Patient Pregnant: No





Family Medical History





- Family History


  ** Mother


Family History: Unknown


Hx Family Cancer: Yes - mom





Physical Exam





- Physical Exam


General Appearance: Alert, Obvious distress


Extremity: calf tenderness, pedal edema, swelling


Skin Exam: mottled - L leg





Departure





- Departure


Clinical Impression: 


 Peripheral vascular disease, Ischemic leg pain





Disposition: Transfer to Hospital


Condition: Fair


Departure Forms:  ED Discharge - Pt. Copy, Patient Portal Self Enrollment


Referrals: 


Vasquez Flores MD [Primary Care Provider] - 1-2 Weeks


Home Medications: 


Ambulatory Orders





ALPRAZolam [Xanax] 0.5 mg PO PRN 07/20/16 


Aspirin [Aspirin Adult Low Dose] 2 tablet PO QAM 07/20/16 


Carvedilol 6.25 mg PO BID 07/20/16 


Fenofibrate [Tricor] 145 mg PO QAM 07/20/16 


Hydrocodone-Acetaminophen [Hydrocodone/Acetaminophen  mg] 1 - 2 each PO 

Q6HR PRN 07/20/16 


Levothyroxine Sodium 50 mcg PO QAM 07/20/16 


Metoclopramide HCl [Metoclopramide Hydrochlor] 10 mg PO BEDTIME 07/20/16 


Clopidogrel Bisulfate [Plavix] 75 mg PO QAM 10/02/16 


DULoxetine HCL [Cymbalta] 30 mg PO BID 10/02/16 


Losartan Potassium 25 mg PO QAM 10/02/16 


Esomeprazole Magnesium [Nexium] 40 mg PO QAM 09/18/17 


Ezetimibe-Simvastatin [Vytorin 10-10 mg] 1 tab PO BEDTIME 09/18/17 


Furosemide [Lasix] 20 mg PO PRN PRN MDD DIURESIS 09/18/17 











Transfer to Outside Facility





- Transfer Information


Accepting Provider:: Dr Garcia


Accepting Facility: [facility]


Reason for Transfer: required specialist not available

## 2019-06-17 ENCOUNTER — HOSPITAL ENCOUNTER (OUTPATIENT)
Dept: HOSPITAL 39 - GMAM | Age: 79
End: 2019-06-17
Attending: FAMILY MEDICINE
Payer: MEDICARE

## 2019-06-17 DIAGNOSIS — E03.9: Primary | ICD-10-CM

## 2019-06-17 DIAGNOSIS — D64.9: ICD-10-CM

## 2019-08-16 ENCOUNTER — HOSPITAL ENCOUNTER (OUTPATIENT)
Dept: HOSPITAL 39 - CT | Age: 79
End: 2019-08-16
Attending: FAMILY MEDICINE
Payer: MEDICARE

## 2019-08-16 DIAGNOSIS — Z89.612: ICD-10-CM

## 2019-08-16 DIAGNOSIS — T87.44: Primary | ICD-10-CM

## 2019-08-16 NOTE — CT
EXAM DESCRIPTION: 

Lower Extremity w/wo Contrast: Computed Tomography.



CLINICAL HISTORY: 

LEFT LEG PAIN. Left lower extremity above-knee amputation.

Swelling and tenderness. Patient unable to wear prosthetic left

lower leg.



COMPARISON: 

CT scan of abdomen and pelvis 5/1/2012.



TECHNIQUE: 

Spiral, axial 2.5 x 2.5 mm scans through the left hip and femur

before and after 75 mL Optiray 320 nonionic IV contrast. Coronal

and sagittal 2.0 mm reconstructions, before and after IV

contrast. No adverse contrast reactions.  Total Exam DLP: 662.16

mGy-cm. This exam was performed according to our departmental CT

dose-optimization program which includes automated exposure

control, adjustment of the mA and/or kV according to patient size

and/or use of iterative reconstruction technique; to reduce

radiation dose to as low as reasonably achievable (ALARA).



FINDINGS: 

A fluid collection with partially enhancing walls and no

calcifications or air-fluid level is visualized abutting the

lateral femoral at the stump of the above-knee amputation. This

fluid measures 2.5 x 1.1 cm in the transverse plane and 1.2 cm

craniocaudal. This abscess is abutting the lateral cortex of the

distal femur. Also abutting the lateral muscles abutting the

femur in the iliotibial band. Periosteal reaction posterior

cortex at the same level. No cortical destruction. No cortical

fracture. The marrow of the distal femur demonstrates enhancement

measuring approximately 7.7 cm from the stump. The marrow above

this level demonstrates a normal fatty appearance, with no

enhancement. Marked vascular calcifications medially in the left

superficial femoral artery. No abscess or soft tissue mass

between the inferior stump and the skin surface.



Narrowing of the superior medial left hip joint space. Decreased

bone density in the left acetabulum and femur. Arthrosis in the

left acetabular facet. Sclerotic changes in the mid left femoral

head and medial femoral neck are stable since the prior study. No

radiodense loose bodies in the joint space.



IMPRESSION: 

1. 2.5 x 1.2 x 1.1 cm abscess in the lateral soft tissues

abutting the stump of the left femoral above-the-knee amputation.

2. No cortical destruction but small posterior cortical

periosteal reaction at the stump. No acute bony changes.

3. Enhancement of the distal 7.7 cm of marrow in the left femur

which is more likely to represent cellular marrow.



Electronically signed by:  Brady Valentine MD  8/16/2019 2:18 PM CDT

Workstation: [id number]

## 2019-08-20 ENCOUNTER — HOSPITAL ENCOUNTER (OUTPATIENT)
Dept: HOSPITAL 39 - GMAM | Age: 79
End: 2019-08-20
Attending: FAMILY MEDICINE
Payer: MEDICARE

## 2019-08-20 DIAGNOSIS — L02.419: ICD-10-CM

## 2019-08-20 DIAGNOSIS — I10: ICD-10-CM

## 2019-08-20 DIAGNOSIS — E03.9: Primary | ICD-10-CM

## 2019-08-20 DIAGNOSIS — E78.2: ICD-10-CM

## 2019-08-22 ENCOUNTER — HOSPITAL ENCOUNTER (OUTPATIENT)
Dept: HOSPITAL 39 - US | Age: 79
End: 2019-08-22
Attending: SURGERY
Payer: MEDICARE

## 2019-08-22 DIAGNOSIS — T87.89: Primary | ICD-10-CM

## 2019-08-22 NOTE — US
T EXAM DESCRIPTION: 

Cyst Aspiration: Ultrasound.



CLINICAL HISTORY: 

LEFT KNEE



COMPARISON: 

CT scan of the lower extremity without and with IV contrast

8/16/2019.



TECHNIQUE: 

Procedure performed by Dr. Behr. Patient gave verbal and written

consent. A needle was placed into fluid collection on the lateral

aspect of the distal left femur under fluoroscopic guidance.

Approximately 1 mL of fluid was obtained.



FINDINGS: 

Echogenic needle seen passing into fluid collection abutting the

distal lateral left femur. Visualization limited by shadowing

from the femoral cortex.



IMPRESSION: 

Successful, ultrasound-guided needle aspiration of soft tissue

fluid collection abutting the distal lateral left femur.



Electronically signed by:  Brady Valentine MD  8/22/2019 6:50 PM CDT

Workstation: [id number]

## 2019-08-22 NOTE — OP
DATE OF PROCEDURE:  08/22/19



PREOPERATIVE DIAGNOSIS: 

1.  Subcutaneous fluid collection, left above-the-knee amputation stump.



POSTOPERATIVE DIAGNOSIS: 

1.  Subcutaneous fluid collection, left above-the-knee amputation stump.



PROCEDURE: 

1.  Sonographically guided aspiration for culture, left leg fluid collection.



SURGEON:  Donald A. Behr, MD.



ASSISTANT:  None.



ANESTHESIA: Local infiltration of 1% lidocaine.



INDICATION FOR SURGERY:  The patient is a 78-year-old male who is 3+ months 
status post left above-knee-amputation amputation for peripheral vascular 
occlusive disease.  He was doing well, fitted with a prosthesis and developed 
pain.  He denies fever or chills, but a CT scan of his stump revealed a fluid 
collection in the lateral aspect.  He was brought to the Ultrasound Suite today 
for sonographically guided aspiration for culture.  



FINDINGS:  Approximately 1 mL of blood fluid was obtained.



PROCEDURE:  After the patient was placed in the supine position, the left leg 
was examined with the ultrasound device.  The fluid collection was identified.  
The skin of the leg lateral to the ultrasound device was prepped with Betadine. 
Local infiltration of anesthesia was obtained.  An 18-gauge needle was then 
introduced into the fluid collection and the fluid was aspirated.  It was not 
easily aspirated.  It was sent for culture and sensitivity.  The patient 
tolerated the procedure well.  Estimated blood loss was less than 5 mL. 



#[id number]

[facility]

## 2019-10-08 ENCOUNTER — HOSPITAL ENCOUNTER (OUTPATIENT)
Dept: HOSPITAL 39 - US | Age: 79
End: 2019-10-08
Attending: FAMILY MEDICINE
Payer: MEDICARE

## 2019-10-08 DIAGNOSIS — M79.89: ICD-10-CM

## 2019-10-08 DIAGNOSIS — T87.89: Primary | ICD-10-CM

## 2019-10-08 NOTE — US
EXAM DESCRIPTION: Soft Tissue,Extremity



CLINICAL HISTORY: 79 years Male, OTHER COMPLICATIONS OF

AMPUTATION STUMP



COMPARISON: None.



TECHNIQUE: Targeted ultrasound of the left stump was performed.



FINDINGS: 3.2 x 1.4 x 1.7 cm fluid collection is identified in

the region of the stump. This could represent a postoperative

seroma versus resolving hematoma.



IMPRESSION:

3.2 x 1.4 x 1.7 cm fluid collection is identified in the region

of the stump. This could represent a postoperative seroma versus

resolving hematoma.



Electronically signed by:  May Wiggins MD  10/8/2019 11:53

AM CDT Workstation: [id number]

## 2019-11-06 ENCOUNTER — HOSPITAL ENCOUNTER (OUTPATIENT)
Dept: HOSPITAL 39 - US | Age: 79
End: 2019-11-06
Attending: SURGERY
Payer: MEDICARE

## 2019-11-06 DIAGNOSIS — T87.89: Primary | ICD-10-CM

## 2019-11-08 NOTE — US
EXAM DESCRIPTION: 

Cyst Aspiration: Ultrasound.



CLINICAL HISTORY: 

COMPLICATIONS OF AMPUTATION



COMPARISON: 

Ultrasound-guided cyst aspiration August 22, 2019.



TECHNIQUE: 

Procedure was performed by Dr. Behr with sterile preparation and

technique and sterile ultrasound guidance. 



FINDINGS: 

Small anechoic collection is noted next to left leg amputation

stump measuring 5.0 x 4.1 x3.9 x 2.2 mm. No fluid could be

aspirated.



IMPRESSION: 

Attempted ultrasound-guided aspiration of fluid collection with

no fluid aspirated.



Electronically signed by:  Brady Valentine MD  11/8/2019 7:58 AM [facility]

Workstation: [id number]